# Patient Record
Sex: MALE | Race: WHITE | NOT HISPANIC OR LATINO | Employment: FULL TIME | URBAN - METROPOLITAN AREA
[De-identification: names, ages, dates, MRNs, and addresses within clinical notes are randomized per-mention and may not be internally consistent; named-entity substitution may affect disease eponyms.]

---

## 2020-09-14 ENCOUNTER — OFFICE VISIT (OUTPATIENT)
Dept: URGENT CARE | Facility: PHYSICIAN GROUP | Age: 20
End: 2020-09-14

## 2020-09-14 VITALS
DIASTOLIC BLOOD PRESSURE: 80 MMHG | BODY MASS INDEX: 22.22 KG/M2 | WEIGHT: 150 LBS | TEMPERATURE: 97.3 F | HEART RATE: 95 BPM | SYSTOLIC BLOOD PRESSURE: 120 MMHG | OXYGEN SATURATION: 96 % | HEIGHT: 69 IN | RESPIRATION RATE: 16 BRPM

## 2020-09-14 DIAGNOSIS — Z02.89 ENCOUNTER FOR EXAMINATION REQUIRED BY DEPARTMENT OF TRANSPORTATION (DOT): ICD-10-CM

## 2020-09-14 PROCEDURE — 7100 PR DOT PHYSICAL: Performed by: PHYSICIAN ASSISTANT

## 2020-12-18 ENCOUNTER — TELEMEDICINE (OUTPATIENT)
Dept: TELEHEALTH | Facility: TELEMEDICINE | Age: 20
End: 2020-12-18
Payer: COMMERCIAL

## 2020-12-18 ENCOUNTER — HOSPITAL ENCOUNTER (OUTPATIENT)
Dept: LAB | Facility: MEDICAL CENTER | Age: 20
End: 2020-12-18
Attending: PHYSICIAN ASSISTANT
Payer: COMMERCIAL

## 2020-12-18 DIAGNOSIS — R05.9 COUGH: ICD-10-CM

## 2020-12-18 DIAGNOSIS — Z20.828 EXPOSURE TO VIRAL DISEASE: ICD-10-CM

## 2020-12-18 PROCEDURE — 99214 OFFICE O/P EST MOD 30 MIN: CPT | Mod: 95,CR,CS | Performed by: PHYSICIAN ASSISTANT

## 2020-12-18 PROCEDURE — U0003 INFECTIOUS AGENT DETECTION BY NUCLEIC ACID (DNA OR RNA); SEVERE ACUTE RESPIRATORY SYNDROME CORONAVIRUS 2 (SARS-COV-2) (CORONAVIRUS DISEASE [COVID-19]), AMPLIFIED PROBE TECHNIQUE, MAKING USE OF HIGH THROUGHPUT TECHNOLOGIES AS DESCRIBED BY CMS-2020-01-R: HCPCS

## 2020-12-18 PROCEDURE — C9803 HOPD COVID-19 SPEC COLLECT: HCPCS

## 2020-12-18 ASSESSMENT — ENCOUNTER SYMPTOMS
VOMITING: 1
NAUSEA: 1
COUGH: 1
CONSTIPATION: 0
SHORTNESS OF BREATH: 0
SORE THROAT: 0
MYALGIAS: 1
HEADACHES: 1
FEVER: 0
DIARRHEA: 1
ABDOMINAL PAIN: 0
EYE PAIN: 0
CHILLS: 0

## 2020-12-18 NOTE — LETTER
December 18, 2020    To Whom It May Concern:         This is confirmation that Scooby Elam attended his scheduled appointment with Ned Moss P.A.-C. on 12/18/20.   Your employee was seen in our clinic today.  A concern for COVID-19 has been identified and testing is in progress. We are asking you to excuse absences while following self-isolation protocol per Center for Disease Control (CDC) guidelines.  Your employee will be able to access test results through our electronic delivery system called [employer].     If the results of testing are positive, your employee should follow the CDC guidelines.  These are isolation for a minimum of 10 days and at least 24 hours have passed since your last fever without the use of fever-reducing medications and all other symptoms have improved.  The health department may contact you and provide further directions regarding self-isolation and return to work.     Negative result without close contact*:  If your employee was tested due to their symptoms without close contact to someone with COVID-19 and their test is negative: your employee should not return to work or regular activities until 24 hours after symptoms fully improve. (For example, if patient feels back to normal on Tuesday, should remain isolated through Wednesday).      Negative with close contact*:  If your employee was tested due to close contact to someone, they should self isolate for 14 days after their exposure.    Negative with positive household member  If your employee was due to a positive household member they should still quarantine for a period of 14 days.  The 14 day period begins once the household member is isolated. If unable to quarantine (for example mom from infant), the CDC advises an additional 14-day quarantine period from the COVID-19 household member.   In general, repeat testing is not necessary and not offered through our [facility] urgent care.     This is the only note that will  be provided from [employer] for this visit.  Your employee will require an appointment with a primary care provider if FMLA or disability forms are required.  If you have any questions please do not hesitate to call me at the phone number listed below.    Sincerely,    LACI Matthew.-C.  [phone]

## 2020-12-18 NOTE — PROGRESS NOTES
Telemedicine Visit: Established Patient     This evaluation was conducted via ZOOM using secure and encrypted videoconferencing technology. The patient was in a private location in the state of [place].    The patient's identity was confirmed and verbal consent was obtained for this virtual visit.    Subjective:   CC: covid test  Scooby Elam is a 20 y.o. male presenting for evaluation and management of dry cough, low energy with generalized malaise.  Having diarrhea and n/v earlier in the week.  He notes a coworker he is in close contact with tested positive for covid subsequent to ample close contact. No f/c.     Patient has close contact with person suspected or confirmed to have COVID-19.    Review of Systems   Constitutional: Positive for malaise/fatigue. Negative for chills and fever.   HENT: Negative for congestion, ear pain and sore throat.    Eyes: Negative for pain.   Respiratory: Positive for cough. Negative for shortness of breath.    Cardiovascular: Negative for chest pain.   Gastrointestinal: Positive for diarrhea, nausea and vomiting. Negative for abdominal pain and constipation.   Genitourinary: Negative for dysuria.   Musculoskeletal: Positive for myalgias.   Skin: Negative for rash.   Neurological: Positive for headaches.        Current medicines (including changes today)  Medications:    • This patient does not have an active medication from one of the medication groupers.    Allergies: Patient has no known allergies.    Problem List: Scooby Elam does not have a problem list on file.    Surgical History:  No past surgical history on file.    Past Social Hx: Scooby Elam  reports that he has never smoked. He has never used smokeless tobacco. He reports previous alcohol use.     Past Family Hx:  Scooby Elam family history is not on file.     Problem list, medications, and allergies reviewed by myself today in Epic.     Objective:   There were no vitals taken for this visit. Not taken due to virtual  "visit.    Physical Exam:  Constitutional: Alert, no distress, well-groomed. Speaking in full unlabored sentences without perceptible increased work of breathing.   Skin: No rashes in visible areas.  Eye: Round. Conjunctiva clear, lids normal. No icterus.   ENMT: Lips pink without lesions, good dentition, moist mucous membranes. Phonation normal.  Neck: No masses, no thyromegaly. Moves freely without pain.  CV: Pulse as reported by patient is normal  Respiratory: Unlabored respiratory effort, no cough or audible wheeze  Psych: Alert and oriented x3, normal affect and mood. Mentation appropriate      Assessment and Plan:     Diagnosis and associated orders:     1. Cough  COVID/SARS COV-2 PCR   2. Exposure to viral disease  COVID/SARS COV-2 PCR      Comments/MDM:     • Patient's reported symptoms are reassuring in that they do not appear to need a higher level of care or hospitalization at this time  • I discussed self isolation   • I discussed ER precautions   • I educated the patient on possibility of a false-negative test and indications for repeat testing  • I instructed the patient to try to follow up with their PCP (if applicable) for follow up care  • I educated them that \"detected\" indicates a POSITIVE result, and \"not-detected\" indicates a NEGATIVE result  • The patient was made aware that results may take 2-5 days to result  • I will contact the patient via [employer] with further instructions once their results become available  • If requested, I provided the patient with a work note to provide to their employer or school regarding returning to work and discontinuation of self isolation.   • All questions were answered and patient demonstrated verbal understanding of above.              "

## 2020-12-18 NOTE — PATIENT INSTRUCTIONS
••••••••••••••••••••••••••••••••        COVID-19 Test Results & Instructions (11-9-20)    After Your COVID-19 Test Stay Home      [address] [address]      P [phone]    Please stay home until you have received your test results, even if you do not have any symptoms.    What do I do if my test is …? Positive  If your test result was positive (detected), this means the novel coronavirus (SARS-CoV-2) that causes COVID-19 was present in your test sample.    If your symptoms are generally mild and stable, please isolate yourself at home. If it becomes difficult to breathe, contact your primary care provider (by phone) as soon as possible.    Next steps:    • Stay home except to get medical care.  • Follow the instructions for home isolation below.  • Call ahead before visiting your primary care provider or a hospital. Ask for an online virtual visit if possible.    When can my home isolation end?    You should isolate yourself:    • For a minimum of 10 days and  • At least 24 hours have passed since your last fever without the use of fever-reducing medications  and  • All other symptoms have improved.    Negative    If you tested negative (not detected), it is highly unlikely that you have COVID-19.    Several respiratory viruses can cause symptoms like yours, including the common cold or the flu.    1          Next steps:      [address] [address]      P [phone]    • Stay home while you are feeling sick.  • Monitor your symptoms and contact your primary care provider if they get worse.  • If you have questions, contact your primary care provider.    When can my home isolation end?    If you were tested because you had close contact (defined below) with someone with COVID-19, you should stay home for 14 days after your close contact with the person.    What counts as close contact?    • You were within 6 feet of someone who has COVID-19 for a total of 15 minutes  or more;  • You provided care at home to someone who is sick with COVID-19;  • You had direct physical contact with the person (hugged or kissed them);  • You shared eating or drinking utensils;  • They sneezed, coughed, or somehow got respiratory droplets on you.    If you were tested because you were exposed to a household contact with COVID-19, you should still quarantine yourself for a period of 14 days. The 14-day quarantine period begins once your affected household contact is isolated. If you are unable to quarantine yourself from your affected household contact, the 14-day quarantine period begins when the patient meets criteria to break isolation.    If you were not exposed to a household contact with COVID-19, you may still be contagious while experiencing symptoms, so you should not return to work or regular activities until 24 hours after symptoms fully improve. For example, if you feel back to normal on Tuesday, you should remain isolated until Wednesday.    Inconclusive    If your test result was inconclusive, this means the novel coronavirus (SARS-CoV-2) that causes COVID- 19 may be present in your test sample.    An inconclusive test result is treated the same as a positive test result.    If your symptoms are generally mild and stable, please isolate yourself at home. If it becomes difficult to breathe, contact your primary care provider.    2          Next steps:      [address] [address]      P [phone]    • Stay home except to get medical care.  • Follow the instructions for home isolation below.  • Call ahead before visiting your primary care provider or a hospital.    When can my home isolation end?    You should isolate yourself:    • For a minimum of 10 days and  • At least 24 hours have passed since your last fever without the use of fever-reducing medications and  • All other symptoms have improved.    Instructions for Self-Isolation at Home  We recommend you  "stay in your home and minimize contact with others to avoid spreading this infection.    Stay home except to get medical care.    Do not go to work, school or public areas. Avoid using public transportation, ridesharing or taxis.    Separate yourself from other people in your home as much as possible.    Stay in a specific room and away from other people in your home as much as possible. Use a separate bathroom if possible.    Clean your hands often.    Wash your hands often with soap and water for at least 20 seconds. If soap and water are not available, clean your hands with an alcohol-based hand  that contains at least 60% alcohol, covering all surfaces of your hands and rubbing them together until they feel dry. Avoid touching your eyes, nose and mouth with unwashed hands.    Do not share household items with other people in your home.    This includes sharing dishes, drinking glasses, cups, eating utensils, towels or bedding. After using these items, they should be washed thoroughly with soap and water.    3          Clean all \"high-touch\" surfaces regularly.      [address] [address]      P [phone]    This includes counters, tabletops, doorknobs, bathroom fixtures, toilets, phones, keyboards, tablets and bedside tables. Also, clean any surfaces that may have blood, stool or body fluids on them. Use a household cleaning spray or wipe, according to the label instructions.    Cover your coughs and sneezes with a tissue, mask or the inside of your elbow.    Throw used tissues in a lined trash can; immediately wash your hands with soap and water for at least 20 seconds or clean your hands with an alcohol-based hand  that contains at least 60% alcohol.  Soap and water should be used if hands are visibly dirty.    When seeking care at a healthcare facility:    • Seek prompt medical attention if your illness is worsening (e.g., difficulty breathing).  • When possible, " call the healthcare provider before arriving.  • Put on a face mask before you enter the facility.  • If possible, put on a face mask before the ambulance or paramedics arrive.  • These steps will help the healthcare provider's office prevent other people from getting infected or exposed.    Detailed information about these steps can be found on the Centers for Disease Control and Prevention's website.    4

## 2020-12-19 LAB
COVID ORDER STATUS COVID19: NORMAL
SARS-COV-2 RNA RESP QL NAA+PROBE: NOTDETECTED
SPECIMEN SOURCE: NORMAL